# Patient Record
Sex: FEMALE | Race: WHITE
[De-identification: names, ages, dates, MRNs, and addresses within clinical notes are randomized per-mention and may not be internally consistent; named-entity substitution may affect disease eponyms.]

---

## 2020-05-29 ENCOUNTER — HOSPITAL ENCOUNTER (EMERGENCY)
Dept: HOSPITAL 26 - MED | Age: 35
LOS: 1 days | Discharge: HOME | End: 2020-05-30
Payer: SELF-PAY

## 2020-05-29 VITALS — WEIGHT: 144 LBS | HEIGHT: 66 IN | BODY MASS INDEX: 23.14 KG/M2

## 2020-05-29 VITALS — DIASTOLIC BLOOD PRESSURE: 64 MMHG | SYSTOLIC BLOOD PRESSURE: 101 MMHG

## 2020-05-29 DIAGNOSIS — F41.9: Primary | ICD-10-CM

## 2020-05-29 NOTE — NUR
PT RESTING, NO SIGNS OF DISTRESS NOTED, PT REMAINS ON HER CELL PHONE WITH HER 
 SINCE HER ARRIVAL TO ER.

## 2020-05-30 VITALS — SYSTOLIC BLOOD PRESSURE: 101 MMHG | DIASTOLIC BLOOD PRESSURE: 64 MMHG

## 2020-05-30 NOTE — NUR
Patient discharged with v/s stable. Written and verbal after care instructions 
given and explained. 

Patient alert, oriented and verbalized understanding of instructions. 
Ambulatory with steady gait. All questions addressed prior to discharge. ID 
band removed. Patient advised to follow up with PMD. Rx of XANAX given. Patient 
educated on indication of medication including possible reaction and side 
effects. Opportunity to ask questions provided and answered.